# Patient Record
Sex: MALE | Race: BLACK OR AFRICAN AMERICAN | NOT HISPANIC OR LATINO | URBAN - METROPOLITAN AREA
[De-identification: names, ages, dates, MRNs, and addresses within clinical notes are randomized per-mention and may not be internally consistent; named-entity substitution may affect disease eponyms.]

---

## 2021-10-15 ENCOUNTER — EMERGENCY (EMERGENCY)
Facility: HOSPITAL | Age: 21
LOS: 0 days | Discharge: HOME | End: 2021-10-15
Attending: EMERGENCY MEDICINE
Payer: COMMERCIAL

## 2021-10-15 VITALS
TEMPERATURE: 98 F | SYSTOLIC BLOOD PRESSURE: 122 MMHG | WEIGHT: 166.01 LBS | HEIGHT: 72 IN | RESPIRATION RATE: 18 BRPM | OXYGEN SATURATION: 100 % | DIASTOLIC BLOOD PRESSURE: 75 MMHG | HEART RATE: 67 BPM

## 2021-10-15 DIAGNOSIS — L20.9 ATOPIC DERMATITIS, UNSPECIFIED: ICD-10-CM

## 2021-10-15 DIAGNOSIS — R21 RASH AND OTHER NONSPECIFIC SKIN ERUPTION: ICD-10-CM

## 2021-10-15 PROCEDURE — 99283 EMERGENCY DEPT VISIT LOW MDM: CPT

## 2021-10-15 RX ORDER — CLOTRIMAZOLE AND BETAMETHASONE DIPROPIONATE 10; .5 MG/G; MG/G
1 CREAM TOPICAL
Qty: 1 | Refills: 0
Start: 2021-10-15 | End: 2021-10-21

## 2021-10-15 NOTE — ED PROVIDER NOTE - OBJECTIVE STATEMENT
21 year old male with no PMH presenting to ED due to itching rash on R elbow x 1 week. States otherwise has not had this issue before. Denies contacts on skin that may have caused it. Itching is only symptom, no pain or swelling.

## 2021-10-15 NOTE — ED PROVIDER NOTE - CARE PROVIDER_API CALL
KATARINA DE LA ROSA  Dermatology  [address] [place] [address], [place] [address]  Phone: [phone]  Fax: [phone]  Follow Up Time: 1-3 Days

## 2021-10-15 NOTE — ED PROVIDER NOTE - PATIENT PORTAL LINK FT
You can access the FollowMyHealth Patient Portal offered by [facility] by registering at the following website: [email or web address]. By joining [employer]’s FollowMyHealth portal, you will also be able to view your health information using other applications (apps) compatible with our system.

## 2021-10-15 NOTE — ED PROVIDER NOTE - CLINICAL SUMMARY MEDICAL DECISION MAKING FREE TEXT BOX
R elbow rash R elbow rash likely atopic dermatitis but will also cover fungal possibility with clotrimazole - will dc with betamethasone/clotrimazole and Derm follow up if not improved.

## 2021-10-15 NOTE — ED ADULT NURSE NOTE - OBJECTIVE STATEMENT
20 y/o male with no PMH. Presents tot  ED with c/c of itchy rash on the right AC area ongoing for the past week then reocccur in 1 month. pt denies any fever, chills, N/V/D/C.

## 2021-10-15 NOTE — ED PROVIDER NOTE - NSFOLLOWUPINSTRUCTIONS_ED_ALL_ED_FT
Contact Dermatitis      Dermatitis is redness, soreness, and swelling (inflammation) of the skin. Contact dermatitis is a reaction to something that touches the skin.  There are two types of contact dermatitis:  •Irritant contact dermatitis. This happens when something bothers (irritates) your skin, like soap.      •Allergic contact dermatitis. This is caused when you are exposed to something that you are allergic to, such as poison ivy.        What are the causes?  •Common causes of irritant contact dermatitis include:  •Makeup.      •Soaps.      •Detergents.      •Bleaches.      •Acids.      •Metals, such as nickel.      •Common causes of allergic contact dermatitis include:  •Plants.      •Chemicals.      •Jewelry.      •Latex.      •Medicines.      •Preservatives in products, such as clothing.          What increases the risk?    •Having a job that exposes you to things that bother your skin.      •Having asthma or eczema.        What are the signs or symptoms?  Symptoms may happen anywhere the irritant has touched your skin. Symptoms include:  •Dry or flaky skin.      •Redness.      •Cracks.      •Itching.      •Pain or a burning feeling.      •Blisters.      •Blood or clear fluid draining from skin cracks.      With allergic contact dermatitis, swelling may occur. This may happen in places such as the eyelids, mouth, or genitals.      How is this treated?  •This condition is treated by checking for the cause of the reaction and protecting your skin. Treatment may also include:  •Steroid creams, ointments, or medicines.      •Antibiotic medicines or other ointments, if you have a skin infection.      •Lotion or medicines to help with itching.      •A bandage (dressing).          Follow these instructions at home:    Skin care     •Moisturize your skin as needed.      •Put cool cloths on your skin.      •Put a baking soda paste on your skin. Stir water into baking soda until it looks like a paste.      • Do not scratch your skin.      •Avoid having things rub up against your skin.      •Avoid the use of soaps, perfumes, and dyes.      Medicines     •Take or apply over-the-counter and prescription medicines only as told by your doctor.      •If you were prescribed an antibiotic medicine, take or apply it as told by your doctor. Do not stop using it even if your condition starts to get better.      Bathing   •Take a bath with:  •Epsom salts.      •Baking soda.      •Colloidal oatmeal.        •Bathe less often.      •Bathe in warm water. Avoid using hot water.      Bandage care     •If you were given a bandage, change it as told by your health care provider.      •Wash your hands with soap and water before and after you change your bandage. If soap and water are not available, use hand .      General instructions   •Avoid the things that caused your reaction. If you do not know what caused it, keep a journal. Write down:  •What you eat.      •What skin products you use.      •What you drink.      •What you wear in the area that has symptoms. This includes jewelry.      •Check the affected areas every day for signs of infection. Check for:  •More redness, swelling, or pain.      •More fluid or blood.      •Warmth.      •Pus or a bad smell.        •Keep all follow-up visits as told by your doctor. This is important.        Contact a doctor if:    •You do not get better with treatment.      •Your condition gets worse.    •You have signs of infection, such as:  •More swelling.      •Tenderness.      •More redness.      •Soreness.      •Warmth.        •You have a fever.      •You have new symptoms.        Get help right away if:    •You have a very bad headache.      •You have neck pain.      •Your neck is stiff.      •You throw up (vomit).      •You feel very sleepy.      •You see red streaks coming from the area.      •Your bone or joint near the area hurts after the skin has healed.      •The area turns darker.      •You have trouble breathing.        Summary    •Dermatitis is redness, soreness, and swelling of the skin.      •Symptoms may occur where the irritant has touched you.      •Treatment may include medicines and skin care.      •If you do not know what caused your reaction, keep a journal.      •Contact a doctor if your condition gets worse or you have signs of infection.      This information is not intended to replace advice given to you by your health care provider. Make sure you discuss any questions you have with your health care provider.
